# Patient Record
Sex: FEMALE | Race: WHITE | NOT HISPANIC OR LATINO | Employment: PART TIME | URBAN - NONMETROPOLITAN AREA
[De-identification: names, ages, dates, MRNs, and addresses within clinical notes are randomized per-mention and may not be internally consistent; named-entity substitution may affect disease eponyms.]

---

## 2019-06-27 ENCOUNTER — OFFICE VISIT (OUTPATIENT)
Dept: PSYCHIATRY | Facility: CLINIC | Age: 49
End: 2019-06-27

## 2019-06-27 VITALS
BODY MASS INDEX: 45.32 KG/M2 | DIASTOLIC BLOOD PRESSURE: 91 MMHG | HEIGHT: 65 IN | SYSTOLIC BLOOD PRESSURE: 135 MMHG | WEIGHT: 272 LBS | HEART RATE: 87 BPM

## 2019-06-27 DIAGNOSIS — Z79.899 MEDICATION MANAGEMENT: ICD-10-CM

## 2019-06-27 DIAGNOSIS — F41.1 GENERALIZED ANXIETY DISORDER: Primary | ICD-10-CM

## 2019-06-27 LAB
AMPHETAMINE CUT-OFF: ABNORMAL
BENZODIAZIPINE CUT-OFF: ABNORMAL
BUPRENORPHINE CUT-OFF: ABNORMAL
COCAINE CUT-OFF: ABNORMAL
EXTERNAL AMPHETAMINE SCREEN URINE: POSITIVE
EXTERNAL BENZODIAZEPINE SCREEN URINE: NEGATIVE
EXTERNAL BUPRENORPHINE SCREEN URINE: NEGATIVE
EXTERNAL COCAINE SCREEN URINE: NEGATIVE
EXTERNAL MDMA: NEGATIVE
EXTERNAL METHADONE SCREEN URINE: NEGATIVE
EXTERNAL METHAMPHETAMINE SCREEN URINE: NEGATIVE
EXTERNAL OPIATES SCREEN URINE: NEGATIVE
EXTERNAL OXYCODONE SCREEN URINE: NEGATIVE
EXTERNAL THC SCREEN URINE: NEGATIVE
MDMA CUT-OFF: ABNORMAL
METHADONE CUT-OFF: ABNORMAL
METHAMPHETAMINE CUT-OFF: ABNORMAL
OPIATES CUT-OFF: ABNORMAL
OXYCODONE CUT-OFF: ABNORMAL
THC CUT-OFF: ABNORMAL

## 2019-06-27 PROCEDURE — 90792 PSYCH DIAG EVAL W/MED SRVCS: CPT | Performed by: NURSE PRACTITIONER

## 2019-06-27 RX ORDER — ROPINIROLE 0.5 MG/1
TABLET, FILM COATED ORAL
COMMUNITY
Start: 2019-06-05

## 2019-06-27 RX ORDER — PHENTERMINE HYDROCHLORIDE 37.5 MG/1
37.5 TABLET ORAL
COMMUNITY

## 2019-06-27 RX ORDER — ALBUTEROL SULFATE 90 UG/1
AEROSOL, METERED RESPIRATORY (INHALATION)
COMMUNITY
Start: 2019-06-05

## 2019-06-27 RX ORDER — ERGOCALCIFEROL 1.25 MG/1
CAPSULE ORAL
COMMUNITY
Start: 2019-06-17

## 2019-06-27 RX ORDER — HYDROCHLOROTHIAZIDE 25 MG/1
TABLET ORAL
COMMUNITY
Start: 2019-06-05

## 2019-06-27 RX ORDER — LISINOPRIL 40 MG/1
TABLET ORAL
COMMUNITY
Start: 2019-06-05

## 2019-06-27 RX ORDER — FENOFIBRATE 160 MG/1
TABLET ORAL
COMMUNITY
Start: 2019-06-17

## 2019-06-27 NOTE — PROGRESS NOTES
Subjective   Kiara Irene is a 48 y.o. female who is here today for initial appointment.     Chief Complaint:  Anxiety     HPI:  History of Present Illness  Patient presents today by herself seeing me for the first time.  When discussing patient's past history patient brought up the fact that she saw Dr. Yarbrough in 2001 for depression and anxiety as well as Dr. Henriquez in [place] years ago. The patient reports the following symptoms of anxiety: constant anxiety/worry, restlessness/on edge, difficulty concentrating, irritability and anxiety causes distress/impairment in important areas of functioning and have caused impairment in important areas of functioning. Patient states that her PCP referred her here as she would not prescribe her Prozac.  When discussing further with the patient regarding her symptoms as well as the medication she is tried in the past patient stated that her PCP would not give her benzodiazepines that she is tried Xanax, Klonopin and Valium in the past and both have been helpful for her and she reports the combination with the Prozac is the only thing that is helpful.  Discussed with the patient that I cannot write benzodiazepines as I do not even have my JOHNY patient stated that that was the only combination that would work for her as she is tried other medications in the past and is a waste of time.  Patient was pleasant but stated that she did not wish to try anything or explore other options as she knew what worked well for her and did not feel the need to continue with the interview and left the office.       Past Psych History: She reported that she saw Dr. Yarbrough in 2001 and then moved to [place] and also saw Dr. Henriquez in [place].  Patient reports that she was diagnosed at that time with depression and anxiety and the only medication helpful for her was Prozac as well as benzodiazepines.  States that she had tried sertraline, Wellbutrin and Cymbalta in the past and both were  ineffective for her.  When discussing that I could not prescribe benzodiazepines as that I did not have my JOHNY but could offer other options patient stated that she did not need to waste my time or hers as she was only needing benzodiazepines and Prozac as they only worked well together and left the visit.    Substance Abuse: UDS positive for amphetamine as the patient takes Adipex.  Request # : [id number].  Patient has a history of substance abuse as well as selling cocaine.  Patient reports that she started abusing pain pills at roughly 14 or 15 and then became addicted later on.  Patient reports that she has been 9 years clean from opioids.     Past Social History: Patient was born in [facility] and raised in [place] [facility] [facility]. She reports she has 3 brothers and 3 sisters and was raised by her mother and father. Patient reports that she graduated high school and had 2 years of basic in college. Patient states that after high school she got  and had four children and has been  26 years. Patient has worked at [employer] in the [employer] and before that she was working as a . Patient states that her 3 kids and 2 grandchildren live with her. She states that she doesn't want to go into too much hx as she was in [employer] for 5 years and just got out 3 years ago in which she was selling cocaine. Patient states that she was abusing opoids for years but has been clean for 9 years.      Family History: Unable to complete as patient did not feel the need to complete the interview as she could not be prescribed Xanax.  Or any benzodiazepines.  family history is not on file.    Medical/Surgical History:  Past Medical History:   Diagnosis Date   • Anxiety    • Depression      Past Surgical History:   Procedure Laterality Date   • LAPAROSCOPIC TUBAL LIGATION         Allergies   Allergen Reactions   • Codeine Nausea And Vomiting       Current Medications:   Current Outpatient Medications   Medication Sig  "Dispense Refill   • albuterol sulfate  (90 Base) MCG/ACT inhaler      • fenofibrate 160 MG tablet      • hydrochlorothiazide (HYDRODIURIL) 25 MG tablet      • lisinopril (PRINIVIL,ZESTRIL) 40 MG tablet      • phentermine (ADIPEX-P) 37.5 MG tablet Take 37.5 mg by mouth Every Morning Before Breakfast.     • rOPINIRole (REQUIP) 0.5 MG tablet      • vitamin D (ERGOCALCIFEROL) 76807 units capsule capsule        No current facility-administered medications for this visit.        Review of Systems   Psychiatric/Behavioral: Positive for agitation. The patient is nervous/anxious.    All other systems reviewed and are negative.      Review of Systems - General ROS: negative for - chills, fever or malaise  Ophthalmic ROS: negative for - loss of vision  ENT ROS: negative for - hearing change  Allergy and Immunology ROS: negative for - hives  Hematological and Lymphatic ROS: negative for - bleeding problems  Endocrine ROS: negative for - skin changes  Respiratory ROS: no cough, shortness of breath, or wheezing  Cardiovascular ROS: no chest pain or dyspnea on exertion  Gastrointestinal ROS: no abdominal pain, change in bowel habits, or black or bloody stools  Genito-Urinary ROS: no dysuria, trouble voiding, or hematuria  Musculoskeletal ROS: negative for - gait disturbance  Neurological ROS: no TIA or stroke symptoms  Dermatological ROS: negative for rash    Objective   Physical Exam   Constitutional: She appears well-developed and well-nourished.   Psychiatric: Her speech is normal. Thought content normal. Her mood appears anxious. She is agitated. Cognition and memory are normal.   Nursing note and vitals reviewed.    Blood pressure 135/91, pulse 87, height 165.1 cm (65\"), weight 123 kg (272 lb). Body mass index is 45.26 kg/m².      Mental Status Exam:   Hygiene:   good  Cooperation:  Cooperative  Eye Contact:  Good  Psychomotor Behavior:  Restless  Affect:  Appropriate  Hopelessness: Denies  Speech:  Normal  Thought " Process:  Goal directed  Thought Content:  Normal  Suicidal:  None  Homicidal:  None  Hallucinations:  None  Delusion:  None  Memory:  Intact  Orientation:  Person, Place, Time and Situation  Reliability:  unable to determine as pt left visit.  Insight:  Poor  Judgement:  unable to determine as patient left visit  Impulse Control:  Poor  Physical/Medical Issues:  No         Assessment/Plan  Discussed medication options.  Attempted to offer the patient alternative medication options as well as starting Prozac and other medications that could help with her anxiety patient stated that she would not waste her time for my time as benzodiazepines have been the only thing helpful for her anxiety in the past and does not wish to continue the visit and left and did not want to follow-up.   Diagnoses and all orders for this visit:    Generalized anxiety disorder    Medication management  -     [facility] Drug Screen        We discussed risks, benefits, and side effects of the above medication and the patient was agreeable with the plan.     Return if symptoms worsen or fail to improve.       Problem List: anxiety     Short Term Goals:Patient will be compliant with clinic appointments.  Patient will be engaged in therapy, medication compliant with minimal side effects. Patient will report decrease of symptoms and frequency.      Long Term Goals:Patient will have minimal symptoms of with continued medication management. Patient will be compliant with treatment and appointments.     Errors in dictation may reflect use of voice recognition software and not all errors in transcription may have been detected prior to signing.